# Patient Record
Sex: MALE | Race: WHITE
[De-identification: names, ages, dates, MRNs, and addresses within clinical notes are randomized per-mention and may not be internally consistent; named-entity substitution may affect disease eponyms.]

---

## 2018-03-29 ENCOUNTER — HOSPITAL ENCOUNTER (EMERGENCY)
Dept: HOSPITAL 89 - ER | Age: 63
Discharge: HOME | End: 2018-03-29
Payer: COMMERCIAL

## 2018-03-29 VITALS — SYSTOLIC BLOOD PRESSURE: 117 MMHG | DIASTOLIC BLOOD PRESSURE: 75 MMHG

## 2018-03-29 VITALS — BODY MASS INDEX: 28.59 KG/M2 | WEIGHT: 205 LBS

## 2018-03-29 DIAGNOSIS — S50.01XA: Primary | ICD-10-CM
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
## 2018-03-29 PROCEDURE — 73080 X-RAY EXAM OF ELBOW: CPT

## 2018-03-29 PROCEDURE — 99283 EMERGENCY DEPT VISIT LOW MDM: CPT

## 2018-03-29 NOTE — ER REPORT
History and Physical


Time Seen By MD:  07:48


HPI/ROS


CHIEF COMPLAINT: Fall onto arm





HISTORY OF PRESENT ILLNESS: Patient is a 62-year-old male with no contributory 

past medical history presents to the emergency department status post slip and 

fall on the ice onto his flexed right elbow. Patient has pain right around the 

olecranon





REVIEW OF SYSTEMS:


Respiratory: No cough, no dyspnea.


Cardiovascular: No chest pain, no palpitations.


Gastrointestinal: No vomiting, no abdominal pain.


Musculoskeletal: No back pain. Right elbow pain


Allergies:  


Coded Allergies:  


     Sulfa (Sulfonamide Antibiotics) (Verified  Allergy, Intermediate, RED SKIN

, 4/18/15)


Uncoded Allergies:  


     HAYFEVER (Allergy, Intermediate, ASTHMA, 6/6/12)


Home Meds


Active Scripts


Hydrocodone Bit/Acetaminophen (HYDROCODON-ACETAMINOPHEN 5-325) 1 Each Tablet, 1 

EACH PO Q4-6H Y for PAIN, #12 TAB 0 Refills


   TAKE ONE TABLET BY MOUTH


   EVERY 4-6 HOURS AS NEEDED


   FOR PAIN


   Prov:ABEL HOUSE MD         3/29/18


Reported Medications


Benralizumab (Fasenra) 30 Mg/Ml Syringe


   3/29/18


Simvastatin (SIMVASTATIN) 20 Mg Tablet, 20 MG PO HS, TAB


   12/9/17


Cetirizine Hcl (ZYRTEC) 10 Mg Tablet, 10 MG PO QDAY, TAB


   3/11/16


Citalopram Hydrobromide (CITALOPRAM HBR) 20 Mg Tablet, 10 MG PO QDAY, #5 TAB


   3/11/16


Tadalafil (CIALIS) 20 Mg Tablet, 20 MG PO QDAY


   3/11/16


Multivitamin With Minerals (MULTIPLE VITAMIN) 1 Each Tablet, 1 EACH PO HS, TAB


   3/11/16


Beclomethasone Dipropionate 80 Mcg/Act (QVAR 80 MCG/ACT) 8.7 Gm Aer.w.adap, 8.7 

GM IH BID


   3/11/16


Mometasone/Formoterol (DULERA 200 MCG/5 MCG INHALER) 13 Gm Inh, 13 GM INH BID, 

INH


   3/11/16


Ipratropium/Albuterol Sulfate (COMBIVENT RESPIMAT INHAL SPRAY) 4 Gm Aer.w.adap, 

1 EACH IH PRN


   3/11/16


Aspirin (Aspirin Ec) 81 Mg Tablet.dr, 81 MG PO DAILY


   2/10/12


Pantoprazole Sod (Protonix) 40 Mg Tabec, 40 MG PO DAILY


   2/10/12


Montelukast Sodium (Singulair) 10 Mg Tab, 10 MG PO HS, 0 Refills


   8/5/10


Discontinued Reported Medications


Omalizumab (XOLAIR) 150 Mg Vial, 150 MG SQ [id number], VIAL


   3/11/16


Discontinued Scripts


Cefdinir 300 Mg Cap (OMNICEF 300 MG CAP (OR EQUIV)) 300 Mg Cap, 300 MG PO BID, #

10 CAP


   Prov:RAYMOND WATKINS DO         12/11/17


Prednisone 10 Mg Tab (PREDNISONE 10 MG TAB) 10 Mg Tablet, 10 MG PO AS DIRECTED, 

#30 TAB


   Prov:RAYMOND WATKINS DO         12/11/17


Past Medical/Surgical History


Patient with history of hypertension, hypercholesterol, gastroesophageal reflux 

disease, asthma


Hx Smoking:  No (CHEWED 20 YEARS)


Smoking Status:  Never Smoker


Exposure to Second Hand Smoke?:  No


Hx Substance Use Disorder:  No


Hx Alcohol Use:  Yes


Constitutional





Vital Sign - Last 24 Hours








 3/29/18 3/29/18 3/29/18 3/29/18





 07:53 07:53 08:00 08:03


 


Temp 97.5   


 


Pulse 56  57 


 


Resp 16   


 


B/P (MAP) 129/84 129/84 (99)  100/85 (90)


 


Pulse Ox 94  94 


 


O2 Delivery Room Air   


 


    





 3/29/18 3/29/18 3/29/18 3/29/18





 08:15 08:30 08:35 08:50


 


Pulse 57 56 55 59


 


B/P (MAP)  108/78 (88)  


 


Pulse Ox 91 90 88 87





 3/29/18 3/29/18 3/29/18 





 08:55 09:00 09:05 


 


Temp 98.0   


 


Pulse   55 


 


B/P (MAP)  117/75 (89)  


 


Pulse Ox   93 








Physical Exam


  General Appearance: The patient is alert, has no immediate need for airway 

protection and no current signs of toxicity.  


Eyes: Pupils equal and round no injection.


Respiratory: Chest is non tender, lungs are clear to auscultation.


Cardiac: regular rate and rhythm 


Gastrointestinal: Abdomen is soft and non tender, no masses, bowel sounds 

normal.


Musculoskeletal:  Neck: Neck is supple and non tender.


   Extremities: Right elbow there is some tenderness to the olecranon the 

patient is able to fully extend at the elbow and has radial and ulnar 

rotational effects with some discomfort.


Examination of the Right hand reveals no acute deformity. The patient is able 

to give a thumbs up sign, is able to make an okay sign, and is able to AB duct 

the fingers. Sensation is intact over the dorsal 1st web space, the volar 

aspect of the 2nd finger, and the volar aspect of the 5th finger. Capillary 

refill is brisk. 


Skin: No rashes or lesions.





Medical Decision Making


ED Course/Re-evaluation


ED Course


 03/29/2018 9:06:56 am patient with contusion to elbow, x-ray was reported 

negative by radiology. Plan will be sling and short course of pain medicine 

followed by range of motion exercises. Patient will be obstructive symptoms 

persist for more than one week present for reevaluation to his primary care 

provider.


Decision to Disposition Date:  Mar 29, 2018


Decision to Disposition Time:  09:06





Depart


Departure


Latest Vital Signs





Vital Signs








  Date Time  Temp Pulse Resp B/P (MAP) Pulse Ox O2 Delivery O2 Flow Rate FiO2


 


3/29/18 09:05  55   93   


 


3/29/18 09:00    117/75 (89)    


 


3/29/18 08:55 98.0       


 


3/29/18 07:53   16   Room Air  








Impression:  


 Primary Impression:  


 Elbow contusion


Condition:  Improved


Disposition:  HOME OR SELF-CARE


Referrals:  


MARY ELMORE (PCP)


1 Week


If symptoms persist


New Scripts


Hydrocodone Bit/Acetaminophen (HYDROCODON-ACETAMINOPHEN 5-325) 1 Each Tablet


1 EACH PO Q4-6H Y for PAIN, #12 TAB 0 Refills


   TAKE ONE TABLET BY MOUTH


   EVERY 4-6 HOURS AS NEEDED


   FOR PAIN


   Prov: ABEL HOUSE MD         3/29/18


Departure Forms:  ER Transition Record, Medications Reconciliation,    Off Work/

School Form,    School or Work Release?:  Work


   Number of days to be released:  2


Patient Portal Information


Patient Instructions:  Contusion in Adults (ED)





Additional Instructions:  


Wear sling for the next 2-3 days. You can discontinue sooner if symptoms 

improve. If your symptoms do not improve after 7 days you should follow with 

her primary care provider for reevaluation.





Problem Qualifiers








 Primary Impression:  


 Elbow contusion


 Encounter type:  initial encounter  Laterality:  right  Qualified Codes:  

S50.01XA - Contusion of right elbow, initial encounter








ABEL HOUSE MD Mar 29, 2018 07:48

## 2018-03-29 NOTE — RADIOLOGY IMAGING REPORT
FACILITY: [facility] 

 

PATIENT NAME: Ric Santamaria

: 1955

MR: [id number]

V: [id number]

EXAM DATE: 

ORDERING PHYSICIAN: ABEL HOUSE

TECHNOLOGIST: 

 

Location: [facility]

Patient: Ric Santamaria

: 1955

MRN: [id number]

Visit/Account:[id number]

Date of Sevice:  3/29/2018

 

ACCESSION #: [id number]

 

Exam type: ELBOW 3 VIEWS RIGHT

 

History: Fell on ice this morning

 

Comparison: None.

 

Findings:

 

There is a tiny radiopaque density projecting just medial and inferior to the medial humeral condyle.
  This is more suggestive of a small srinivasa of calcium as opposed to a bone chip.  There is no adjacen
t soft tissue swelling.  Otherwise no evidence of acute fracture-dislocation involving the right elbo
w.  If patient's symptoms persist CT or MR may be helpful

 

IMPRESSION:

 

1.  No evidence of acute fracture-dislocation involving the right elbow.  There is a tiny srinivasa of ca
lcium projecting just medial and inferior to the medial humeral epicondyles more likely degenerative 
in nature than posttraumatic

 

Report Dictated By: Suzy Hussein MD at 3/29/2018 8:54 AM

 

Report E-Signed By: Suzy Hussein MD  at 3/29/2018 8:57 AM

 

WSN:ALDA